# Patient Record
Sex: FEMALE | Race: WHITE | NOT HISPANIC OR LATINO | URBAN - METROPOLITAN AREA
[De-identification: names, ages, dates, MRNs, and addresses within clinical notes are randomized per-mention and may not be internally consistent; named-entity substitution may affect disease eponyms.]

---

## 2017-11-06 ENCOUNTER — OFFICE (OUTPATIENT)
Dept: URBAN - METROPOLITAN AREA CLINIC 75 | Facility: CLINIC | Age: 32
End: 2017-11-06
Payer: MEDICAID

## 2017-11-06 VITALS
SYSTOLIC BLOOD PRESSURE: 130 MMHG | DIASTOLIC BLOOD PRESSURE: 74 MMHG | WEIGHT: 241 LBS | HEIGHT: 71 IN | HEART RATE: 72 BPM

## 2017-11-06 DIAGNOSIS — R19.4 CHANGE IN BOWEL HABIT: ICD-10-CM

## 2017-11-06 DIAGNOSIS — R19.5 OTHER FECAL ABNORMALITIES: ICD-10-CM

## 2017-11-06 DIAGNOSIS — R11.0 NAUSEA: ICD-10-CM

## 2017-11-06 DIAGNOSIS — K59.00 CONSTIPATION, UNSPECIFIED: ICD-10-CM

## 2017-11-06 DIAGNOSIS — R53.83 OTHER FATIGUE: ICD-10-CM

## 2017-11-06 DIAGNOSIS — R63.0 ANOREXIA: ICD-10-CM

## 2017-11-06 DIAGNOSIS — R14.2 ERUCTATION: ICD-10-CM

## 2017-11-06 PROCEDURE — 99204 OFFICE O/P NEW MOD 45 MIN: CPT | Performed by: INTERNAL MEDICINE

## 2017-11-06 RX ORDER — LUBIPROSTONE 24 UG/1
48 CAPSULE, GELATIN COATED ORAL
Qty: 10 | Refills: 0 | Status: ACTIVE
Start: 2017-11-06

## 2017-11-06 NOTE — SERVICENOTES
The above note was scribed by Apolonia Kaur PA-C. Dr. Irwin saw this patient and examined this patient while in the office.

## 2017-11-06 NOTE — SERVICEHPINOTES
Thank you for allowing me to participate in the care of this patient. Mary presents with c/o constipation with bright red blood in the stool x2-3 weeks. Her BMs are not painful. She went a week between BMs despite taking miralax and other OTC meds. She also reports a full feeling, no appetite, nausea without vomiting. She has the feeling like she needs to either vomit or have a BM to get relief from the full feeling. She does c/o all over abdominal cramping and excessive burping. She does have a history of hemorrhoids during pregnancy which were painful at the time. Now she has no rectal pain.  She had labs with her PCP. Her Aunt was diagnosed with colon cancer last year, otherwise no family history of colon polyps or colon cancer. CBC from her PCP showed CBC H/H 10.5/37. She also had iron testing and CMP done--will request. She was advised to start OTC iron by her PCP. She does report heavy periods.

## 2019-09-30 ENCOUNTER — HOSPITAL ENCOUNTER (OUTPATIENT)
Dept: URGENT CARE | Facility: CLINIC | Age: 34
Discharge: HOME | End: 2019-09-30
Attending: EMERGENCY MEDICINE